# Patient Record
Sex: MALE | Race: WHITE | NOT HISPANIC OR LATINO | Employment: STUDENT | URBAN - METROPOLITAN AREA
[De-identification: names, ages, dates, MRNs, and addresses within clinical notes are randomized per-mention and may not be internally consistent; named-entity substitution may affect disease eponyms.]

---
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
## 2021-05-27 ASSESSMENT — PATIENT HEALTH QUESTIONNAIRE - PHQ9
SUM OF ALL RESPONSES TO PHQ QUESTIONS 1-9: 3
SUM OF ALL RESPONSES TO PHQ QUESTIONS 1-9: 4

## 2021-05-29 ENCOUNTER — HEALTH MAINTENANCE LETTER (OUTPATIENT)
Age: 21
End: 2021-05-29

## 2021-05-31 VITALS — HEIGHT: 65 IN | BODY MASS INDEX: 21.33 KG/M2 | WEIGHT: 128 LBS

## 2021-05-31 VITALS — BODY MASS INDEX: 20.55 KG/M2 | WEIGHT: 116 LBS | HEIGHT: 63 IN

## 2021-06-04 VITALS
BODY MASS INDEX: 21.24 KG/M2 | TEMPERATURE: 97.2 F | WEIGHT: 132.2 LBS | OXYGEN SATURATION: 99 % | DIASTOLIC BLOOD PRESSURE: 59 MMHG | SYSTOLIC BLOOD PRESSURE: 113 MMHG | HEART RATE: 67 BPM | HEIGHT: 66 IN

## 2021-06-06 NOTE — PROGRESS NOTES
[facility] Well Child Check    ASSESSMENT & PLAN  Rafa Clayton is a 19 y.o. who has normal growth and normal development.    Diagnoses and all orders for this visit:    Encounter for routine child health examination with abnormal findings  -     Hearing Screening  -     Vision Screening  -     PHQ9 Depression Screen  -     Lipid Cascade FASTING  -     Glucose    Other orders  -     Meningococcal MCV4P  -     Influenza, Seasonal Quad, PF, =/> 6months (syringe)  -     Cancel: Hemoglobin  -     Cancel: Lipid Cascade RANDOM  -     Varicella vaccine subq        Return to clinic in 1 year for a Well Child Check or sooner as needed    IMMUNIZATIONS/LABS  Immunizations were reviewed and orders were placed as appropriate.  I have discussed the risks and benefits of all of the vaccine components with the patient/parents.  All questions have been answered.    REFERRALS  Dental:  Recommend routine dental care as appropriate., The patient has already established care with a dentist.  Other:  No additional referrals were made at this time.    ANTICIPATORY GUIDANCE  I have reviewed age appropriate anticipatory guidance.  Social:  Friends, Peer Pressure, Employment and Extracurricular Activities  Parenting:  Support, Harper/Dependence and Confidential Health Care  Nutrition:  Body Image  Play and Communication:  Organized Sports and Appropriate Use of TV  Health:  Drugs, Smoking, Alcohol, Self Testicular Exam and Sun Screen  Safety:  Seat Belts  Sexuality:  Safe Sex and STD's    HEALTH HISTORY  Do you have any concerns that you'd like to discuss today?: No concerns       Roomed by: IRAJ Randolph CMA    Refills needed? No    Do you have any forms that need to be filled out? No        Do you have any significant health concerns in your family history?: No  Family History   Problem Relation Age of Onset     No Medical Problems Mother      No Medical Problems Father      No Medical Problems Sister      No Medical Problems Brother       Cancer Maternal Grandmother      Since your last visit, have there been any major changes in your family, such as a move, job change, separation, divorce, or death in the family?: Yes, cousins death  Has a lack of transportation kept you from medical appointments?: No    Home  Who lives in your home?:  Patient, mom, dad, brother, sister  Social History     Social History Narrative     Not on file     Do you have any concerns about losing your housing?: No  Is your housing safe and comfortable?: Yes  Do you have any trouble with sleep?:  No    Education  What school do you child attend?:  [employer]  What grade are you in?:  12th  How do you perform in school (grades, behavior, attention, homework?: decent     Eating  Do you eat regular meals including fruits and vegetables?:  no  What are you drinking (cow's milk, water, soda, juice, sports drinks, energy drinks, etc)?: cow's milk- whole and water  Have you been worried that you don't have enough food?: No  Do you have concerns about your body or appearance?:  No    Activities  Do you have friends?:  yes  Do you get at least one hour of physical activity per day?:  no  How many hours a day are you in front of a screen other than for schoolwork (computer, TV, phone)?:  3  What do you do for exercise?:  Life weights, run, walks  Do you have interest/participate in community activities/volunteers/school sports?:  yes    VISION/HEARING  Vision: Completed. See Results  Hearing:  Completed. See Results     Hearing Screening    125Hz 250Hz 500Hz 1000Hz 2000Hz 3000Hz 4000Hz 6000Hz 8000Hz   Right ear:    20 20  20 20    Left ear:   25 20 20  20 20    Comments: 40 at 1000 in both ears     Visual Acuity Screening    Right eye Left eye Both eyes   Without correction: 10/8 10/8 10/10   With correction:      Comments: Plus Lens: Pass: blurring of vision with +2.50 lens glasses      MENTAL HEALTH SCREENING  Social-emotional & mental health screening:   PHQ 3/13/2020  "  PHQ-A Total Score 4   PHQ-A Depressed most days in past year No   PHQ-A Mood affect on daily activities Somewhat difficult   PHQ-A Suicide Ideation past 2 weeks Not at all   PHQ-A Suicide Ideation past month No   PHQ-A Previous suicide attempt No       Depression: No current symptoms.  Peer relationships: no concerns  Family relationships: no concerns  Trouble with the law: No    TB Risk Assessment:  The patient and/or parent/guardian answer positive to:  no known risk of TB    Dyslipidemia Risk Screening  Have either of your parents or any of your grandparents had a stroke or heart attack before age 55?: No  Any parents with high cholesterol or currently taking medications to treat?: No     Dental  When was the last time you saw the dentist?: 3-6 months ago   Parent/Guardian declines the fluoride varnish application today. Fluoride not applied today.    There is no problem list on file for this patient.      Drugs  Does the patient use tobacco/alcohol/drugs?:  no    Safety  Does the patient have any safety concerns (peer or home)?:  no  Does the patient use safety belts, helmets and other safety equipment?:  yes    Sex  Have you ever had sex?:  Yes  Have any of your past or current sex partners been infected with HIV, bisexual, or injection drug users?: No  Are you having unprotected sex with multiple partners?:  No  Have you or any of your partners ever been treated for a sexually transmitted infections?:  No    MEASUREMENTS  Height:  5' 5.5\" (1.664 m)  Weight: 132 lb 3.2 oz (60 kg)  BMI: Body mass index is 21.66 kg/m .  Blood Pressure: 113/59  Blood pressure percentiles are not available for patients who are 18 years or older.    PHYSICAL EXAM    General:  alert, in no acute distress  Hydration Status: well hydrated with moist mucous membranes,   Skin: No obvious skin rash or lesions  Head: normocephalic, atraumatic  Eyes: anicteric, no injection, no discharge, no swelling  Ears: TMs clear bilaterally  Nose: " septum midline, pink mucosa, no discharge  Oropharynx: moist mucosa, no oral lesions  Neck: supple, full range of motion  Chest: clear to auscultation bilaterally  Cardiovascular: regular rhythm, regular rate  Abdomen: normal bowel sounds, soft, non-tender, no organomegaly or masses  : Patient declines exam  Musculoskeletal: warm and well-perfused, normal tone throughout  Lymphatics: no adenopathy noted  Neurologic: grossly intact, strength normal

## 2021-06-09 NOTE — PROGRESS NOTES
"Rafa Clayton is a 19 y.o. male who is being evaluated via a billable telephone visit.    Chief Complaint   Patient presents with     Requesting Covid testing     patient has been exposed     Rafa Clayton is traveling to [place] and has been exposed to friends who were testing positive for COVID 19. He has had no symptoms but would like to get tested there [place] before he returns to [place]. He was told that if he had an order faxed over to the testing facillity he could get the test. The fax number is [phone].  ROS: negative for fever, cough, malaise, fatigue, myalgias and as in HPI.    The patient has been notified of following:     \"This telephone visit will be conducted via a call between you and your physician/provider. We have found that certain health care needs can be provided without the need for a physical exam.  This service lets us provide the care you need with a short phone conversation.  If a prescription is necessary we can send it directly to your pharmacy.  If lab work is needed we can place an order for that and you can then stop by our lab to have the test done at a later time.    Telephone visits are billed at different rates depending on your insurance coverage. During this emergency period, for some insurers they may be billed the same as an in-person visit.  Please reach out to your insurance provider with any questions.    If during the course of the call the physician/provider feels a telephone visit is not appropriate, you will not be charged for this service.\"    Patient has given verbal consent to a Telephone visit? Yes    What phone number would you like to be contacted at? [phone]    Patient would like to receive their AVS by AVS Preference: Rose.    Additional provider notes: Objective:  General: alert, oriented and in no distress  Respiratory: no difficulty speaking in full sentences, no apparent shortness of breath or audible wheezing.  Mood: no pressured speech, " normal voice inflections, no agitation, no suicidal or homicidal ideations.    Assessment/Plan:  1. Exposure to COVID-19 virus  Asymptomatic COVID-19 Virus (CORONAVIRUS) PCR     Test to be faxed today to [place] testing [facility] for Covid 19  - [phone]      Phone call duration:  5 minutes    Erick Mabry

## 2021-06-11 NOTE — PROGRESS NOTES
[facility] Well Child Check    ASSESSMENT & PLAN  Rafa Clayton is a 16  y.o. 7  m.o. who has normal growth and normal development.    Diagnoses and all orders for this visit:    Encounter for routine child health examination without abnormal findings  -     Hearing Screening  -     Vision Screening    Travel advice encounter    Other orders  -     azithromycin (ZITHROMAX) 500 MG tablet; Take two tablets once for diarrhea  Dispense: 4 tablet; Refill: 0  -     typhoid (VIVOTIF) SR capsule; Take one every other day- finish one week prior to travel  Dispense: 4 capsule; Refill: 0   doing well with clindamycin topical solution  Continue as needed for acne      Travel consultation  Patient will be traveling to [place] in the upcoming months with his family  Discussed CDC recommendations with him and his mother  We will send prescription for as needed use of azithromycin for traveler's diarrhea  Send prescription for Vivotif for him to complete prior to travel  Return to clinic in 1 year for a Well Child Check or sooner as needed    IMMUNIZATIONS/LABS  Immunizations were reviewed and orders were placed as appropriate.    REFERRALS  Dental:  Recommend routine dental care as appropriate.  Other:  No additional referrals were made at this time.    ANTICIPATORY GUIDANCE  I have reviewed age appropriate anticipatory guidance.  Social:  Friends and Peer Pressure  Nutrition:  Junk Food and Body Image  Play and Communication:  Organized Sports and Hobbies  Health:  Acne, Drugs, Smoking, Alcohol and Self Testicular Exam  Safety:  Seat Belts  Sexuality:  Body Changes    HEALTH HISTORY  Do you have any concerns that you'd like to discuss today?: No concerns       Refills needed? No    Do you have any forms that need to be filled out? No        Do you have any significant health concerns in your family history?: No  No family history on file.  Since your last visit, have there been any major changes in your family, such as a move, job  change, separation, divorce, or death in the family?: No    Home  Who lives in your home?:   Parents, brother and sister   Social History     Social History Narrative     No narrative on file     Do you have any trouble with sleep?:  No    Education  What school does your child attend?:   [employer]   What grade is your child in?:  10th  How does the patient perform in school (grades, behavior, attention, homework?:  No concerns      Eating  Does patient eat regular meals including fruits and vegetables?:  yes  What is the patient drinking (cow's milk, water, soda, juice, sports drinks, energy drinks, etc)?: cow's milk- whole and water  Does patient have concerns about body or appearance?:  No    Activities  Does the patient have friends?:  yes  Does the patient get at least one hour of physical activity per day?:  no  Does the patient have less than 2 hours of screen time per day (aside from homework)?:  yes  What does your child do for exercise?:   Walking   Does the patient have interest/participate in community activities/volunteers/school sports?:  no    MENTAL HEALTH SCREENING  PHQ-2 Total Score: 0 (6/28/2017 12:00 PM)  PHQ-2 Total Score: 0 (6/28/2017 12:00 PM)    VISION/HEARING  Vision: Completed. See Results  Hearing:  Completed. See Results     Hearing Screening    125Hz 250Hz 500Hz 1000Hz 2000Hz 3000Hz 4000Hz 6000Hz 8000Hz   Right ear:   25 25 25  25     Left ear:   25 25 25  25        Visual Acuity Screening    Right eye Left eye Both eyes   Without correction: 10/8 10/8    With correction:          TB Risk Assessment:  The patient and/or parent/guardian answer positive to:  patient and/or parent/guardian answer 'no' to all screening TB questions    Dental  Is your child being seen by a dentist?  Yes  Flouride Varnish Application Screening  Is child seen by dentist?     Yes    There is no problem list on file for this patient.      Drugs  Does the patient use tobacco/alcohol/drugs?:  no    Safety  Does the  "patient have any safety concerns (peer or home)?:  no  Does the patient use safety belts, helmets and other safety equipment?:  yes    Sex  Is the patient sexually active?:  no    MEASUREMENTS  Height:  5' 2.5\" (1.588 m)  Weight: 116 lb (52.6 kg)  BMI: Body mass index is 20.88 kg/(m^2).  Blood Pressure: 94/62  Blood pressure percentiles are 6 % systolic and 47 % diastolic based on NHBPEP's 4th Report. Blood pressure percentile targets: 90: 125/78, 95: 128/82, 99 + 5 mmH/95.    PHYSICAL EXAM  Physical Examination: General appearance - alert, well appearing, and in no distress  Mental status - alert, oriented to person, place, and time  Eyes - pupils equal and reactive, extraocular eye movements intact  Ears - bilateral TM's and external ear canals normal  Nose - normal and patent, no erythema, discharge or polyps  Mouth - mucous membranes moist, pharynx normal without lesions  Neck - supple, no significant adenopathy  Lymphatics - no palpable lymphadenopathy, no hepatosplenomegaly  Chest - clear to auscultation, no wheezes, rales or rhonchi, symmetric air entry  Heart - normal rate, regular rhythm, normal S1, S2, no murmurs, rubs, clicks or gallops  Abdomen - soft, nontender, nondistended, no masses or organomegaly  Back exam - full range of motion, no tenderness, palpable spasm or pain on motion  Neurological - alert, oriented, normal speech, no focal findings or movement disorder noted  Musculoskeletal - no joint tenderness, deformity or swelling  Extremities - peripheral pulses normal, no pedal edema, no clubbing or cyanosis  Skin - normal coloration and turgor, no rashes, no suspicious skin lesions noted    "

## 2021-06-16 NOTE — PROGRESS NOTES
ASSESSMENT/PLAN  1. Health care maintenance  Discussed with mom and patient had been with mom out of the room with patient alone he denies any recent drug use  We will obtain a drug screen today to rule out any recent use but discussed within the limitations of a urine drug screen  Patient feels that explains both with the mother in the room and outside of the room that the majority of changes have been due to laziness and fatigue he does not get enough sleep on a regular basis  Discussed proper sleep hygiene and sleep changes and needs at his age  We will follow based on drug screen results  Again patient declined any drug use recently when he was alone in the room with me  Discussed with mom present other concerns which he does not express such as anxiety or depression leading to change in personality and performance at school levels  - Drug Abuse 1+, Urine        SUBJECTIVE:   Chief Complaint   Patient presents with     Drug screening     Rafa Clayton 17 y.o. male    Current Outpatient Prescriptions   Medication Sig Dispense Refill     clindamycin (CLEOCIN T) 1 % external solution        azithromycin (ZITHROMAX) 500 MG tablet Take two tablets once for diarrhea 4 tablet 0     typhoid (VIVOTIF) SR capsule Take one every other day- finish one week prior to travel 4 capsule 0     No current facility-administered medications for this visit.      Allergies: Review of patient's allergies indicates no known allergies.   No LMP for male patient.    HPI:   Mom presents with 17-year-old male today for concerns over possible drug use and would like to have a drug screen done  Patient has recently failed a class at his high school he has previously been a straight a student  Is been missing assignments and missing or being late on days of academic testing leading to the drastic change in his grade  He feels his sleeping is sporadic and a large reason been over tired and lazy for change in the performance  We discussed with  "him and his mother today limitations of drug screening we will obtain this today but when his mother is not in the room denies any recent drug use so do not expect anything be positive on drug screen  Discussed with the patient proper sleep hygiene and the need for regular sleep  Encouraged him to try to bring his GPA back up to where was previously as he was having great success and will open up many more opportunities for him moving forward    ROS: negative except as per HPI    OBJECTIVE:   The patient appears well, alert, oriented x 3, in no distress.  /58 (Patient Site: Right Arm, Patient Position: Sitting, Cuff Size: Adult Regular)  Pulse 84  Temp 98.4  F (36.9  C) (Oral)   Resp 16  Ht 5' 5.16\" (1.655 m)  Wt 128 lb (58.1 kg)  BMI 21.2 kg/m2     Lungs: clear, good air entry, no wheezes, rhonchi or rales.   Cardiac: S1 and S2 normal, no murmurs, regular rate and rhythm.   Extremities: show no edema, normal peripheral pulses.   Neurological: normal, no focal findings.  Skin: clear, dry, no rashes/lesions  Psych- normal mood and affect      Pt states an understanding and agrees to the above plan.    "

## 2021-06-18 NOTE — PATIENT INSTRUCTIONS - HE
Patient Instructions by Nichole Randolph CMA at 3/13/2020  2:50 PM     Author: Nichole Randolph CMA Service: -- Author Type: Certified Medical Assistant    Filed: 3/13/2020  2:57 PM Encounter Date: 3/13/2020 Status: Signed    : Nichole Randolph CMA (Certified Medical Assistant)         3/13/2020  Wt Readings from Last 1 Encounters:   02/22/18 128 lb (58.1 kg) (34 %, Z= -0.41)*     * Growth percentiles are based on CDC (Boys, 2-20 Years) data.       Acetaminophen Dosing Instructions  (May take every 4-6 hours)      WEIGHT   AGE Infant/Children's  160mg/5ml Children's   Chewable Tabs  80 mg each Alberto Strength  Chewable Tabs  160 mg     Milliliter (ml) Soft Chew Tabs Chewable Tabs   6-11 lbs 0-3 months 1.25 ml     12-17 lbs 4-11 months 2.5 ml     18-23 lbs 12-23 months 3.75 ml     24-35 lbs 2-3 years 5 ml 2 tabs    36-47 lbs 4-5 years 7.5 ml 3 tabs    48-59 lbs 6-8 years 10 ml 4 tabs 2 tabs   60-71 lbs 9-10 years 12.5 ml 5 tabs 2.5 tabs   72-95 lbs 11 years 15 ml 6 tabs 3 tabs   96 lbs and over 12 years   4 tabs     Ibuprofen Dosing Instructions- Liquid  (May take every 6-8 hours)      WEIGHT   AGE Concentrated Drops   50 mg/1.25 ml Infant/Children's   100 mg/5ml     Dropperful Milliliter (ml)   12-17 lbs 6- 11 months 1 (1.25 ml)    18-23 lbs 12-23 months 1 1/2 (1.875 ml)    24-35 lbs 2-3 years  5 ml   36-47 lbs 4-5 years  7.5 ml   48-59 lbs 6-8 years  10 ml   60-71 lbs 9-10 years  12.5 ml   72-95 lbs 11 years  15 ml       Ibuprofen Dosing Instructions- Tablets/Caplets  (May take every 6-8 hours)    WEIGHT AGE Children's   Chewable Tabs   50 mg Alberto Strength   Chewable Tabs   100 mg Alberto Strength   Caplets    100 mg     Tablet Tablet Caplet   24-35 lbs 2-3 years 2 tabs     36-47 lbs 4-5 years 3 tabs     48-59 lbs 6-8 years 4 tabs 2 tabs 2 caps   60-71 lbs 9-10 years 5 tabs 2.5 tabs 2.5 caps   72-95 lbs 11 years 6 tabs 3 tabs 3 caps          Patient Education      BRIGHT FUTURES HANDOUT- PATIENT  18  THROUGH 21 YEAR VISITS  Here are some suggestions from [employer] experts that may be of value to your family.     HOW YOU ARE DOING  Enjoy spending time with your family.  Find activities you are really interested in, such as sports, theater, or volunteering.  Try to be responsible for your schoolwork or work obligations.  Always talk through problems and never use violence.  If you get angry with someone, try to walk away.  If you feel unsafe in your home or have been hurt by someone, let us know. Hotlines and community agencies can also provide confidential help.  Talk with us if you are worried about your living or food situation. Community agencies and programs such as SNAP can help.  Dont smoke, vape, or use drugs. Avoid people who do when you can. Talk with us if you are worried about alcohol or drug use in your family.    YOUR DAILY LIFE  Visit the dentist at least twice a year.  Brush your teeth at least twice a day and floss once a day.  Be a healthy eater.  Have vegetables, fruits, lean protein, and whole grains at meals and snacks.  Limit fatty, sugary, salty foods that are low in nutrients, such as candy, chips, and ice cream.  Eat when youre hungry. Stop when you feel satisfied.  Eat breakfast.  Drink plenty of water.  Make sure to get enough calcium every day.  Have 3 or more servings of low-fat (1%) or fat-free milk and other low-fat dairy products, such as yogurt and cheese.  Women: Make sure to eat foods rich in folate, such as fortified grains and dark- green leafy vegetables.  Aim for at least 1 hour of physical activity every day.  Wear safety equipment when you play sports.  Get enough sleep.  Talk with us about managing your health care and insurance as an adult.    YOUR FEELINGS  Most people have ups and downs. If you are feeling sad, depressed, nervous, irritable, hopeless, or angry, let us know or reach out to another health care professional.  Figure out healthy ways to deal with  stress.  Try your best to solve problems and make decisions on your own.  Sexuality is an important part of your life. If you have any questions or concerns, we are here for you.    HEALTHY BEHAVIOR CHOICES  Avoid using drugs, alcohol, tobacco, steroids, and diet pills. Support friends who choose not to use.  If you use drugs or alcohol, let us know or talk with another trusted adult about it. We can help you with quitting or cutting down on your use.  Make healthy decisions about your sexual behavior.  If you are sexually active, always practice safe sex. Always use birth control along with a condom to prevent pregnancy and sexually transmitted infections.  All sexual activity should be something you want. No one should ever force or try to convince you.  Protect your hearing at work, home, and concerts. Keep your earbud volume down.    STAYING SAFE  Always be a safe and cautious .  Insist that everyone use a lap and shoulder seat belt.  Limit the number of friends in the car and avoid driving at night.  Avoid distractions. Never text or talk on the phone while you drive.  Do not ride in a vehicle with someone who has been using drugs or alcohol.  If you feel unsafe driving or riding with someone, call someone you trust to drive you.  Wear helmets and protective gear while playing sports. Wear a helmet when riding a bike, a motorcycle, or an ATV or when skiing or skateboarding.  Always use sunscreen and a hat when youre outside.  Fighting and carrying weapons can be dangerous. Talk with your parents, teachers, or doctor about how to avoid these situations.      Helpful Resources:  National Domestic Violence Hotline: 709.788.7630   Consistent with Bright Futures: Guidelines for Health Supervision of Infants, Children, and Adolescents, 4th Edition  For more information, go to https://brightfutures.aap.org.

## 2021-06-20 NOTE — LETTER
Letter by Malvin Oakes MD at      Author: Malvin Oakes MD Service: -- Author Type: --    Filed:  Encounter Date: 3/18/2020 Status: (Other)         Rafa Clayton  [address] [place] [address]             March 18, 2020         Dear Mr. Clayton,    Below are the results from your recent visit:    Resulted Orders   Lipid [place] FASTING   Result Value Ref Range    Cholesterol 105 <=199 mg/dL    Triglycerides 46 <=149 mg/dL    HDL Cholesterol 36 (L) >=40 mg/dL    LDL Calculated 60 <=129 mg/dL    Patient Fasting > 8hrs? Yes    Glucose   Result Value Ref Range    Glucose 96 70 - 99 mg/dL    Patient Fasting > 8hrs? Yes     Narrative    Fasting Glucose reference range is 70-99 mg/dL per  American Diabetes Association (ADA) guidelines.       Lab tests are normal.  Good cholesterol but HDL is low.   Exercise, weight loss and substitution of monounsaturated for saturated fatty acids all can raise HDL-cholesterol. (Fish oil and marine omega-3 fatty acids).     Please call with questions or contact us using [employer].    Sincerely,        Electronically signed by Malvin Oakes MD

## 2021-09-18 ENCOUNTER — HEALTH MAINTENANCE LETTER (OUTPATIENT)
Age: 21
End: 2021-09-18

## 2022-06-25 ENCOUNTER — HEALTH MAINTENANCE LETTER (OUTPATIENT)
Age: 22
End: 2022-06-25

## 2022-11-02 ENCOUNTER — IMMUNIZATION (OUTPATIENT)
Dept: FAMILY MEDICINE | Facility: CLINIC | Age: 22
End: 2022-11-02
Payer: COMMERCIAL

## 2022-11-02 PROCEDURE — 90686 IIV4 VACC NO PRSV 0.5 ML IM: CPT

## 2022-11-02 PROCEDURE — 90471 IMMUNIZATION ADMIN: CPT

## 2023-07-08 ENCOUNTER — HEALTH MAINTENANCE LETTER (OUTPATIENT)
Age: 23
End: 2023-07-08

## 2024-03-27 ENCOUNTER — OFFICE VISIT (OUTPATIENT)
Dept: FAMILY MEDICINE | Facility: CLINIC | Age: 24
End: 2024-03-27
Payer: COMMERCIAL

## 2024-03-27 VITALS
HEART RATE: 62 BPM | SYSTOLIC BLOOD PRESSURE: 127 MMHG | TEMPERATURE: 98.6 F | BODY MASS INDEX: 21.98 KG/M2 | WEIGHT: 136.8 LBS | RESPIRATION RATE: 16 BRPM | OXYGEN SATURATION: 99 % | HEIGHT: 66 IN | DIASTOLIC BLOOD PRESSURE: 83 MMHG

## 2024-03-27 DIAGNOSIS — Z02.89 ENCOUNTER FOR PHYSICAL EXAMINATION RELATED TO EMPLOYMENT: Primary | ICD-10-CM

## 2024-03-27 PROCEDURE — 86580 TB INTRADERMAL TEST: CPT | Performed by: FAMILY MEDICINE

## 2024-03-27 PROCEDURE — 99385 PREV VISIT NEW AGE 18-39: CPT | Performed by: FAMILY MEDICINE

## 2024-03-27 SDOH — HEALTH STABILITY: PHYSICAL HEALTH: ON AVERAGE, HOW MANY MINUTES DO YOU ENGAGE IN EXERCISE AT THIS LEVEL?: 60 MIN

## 2024-03-27 SDOH — HEALTH STABILITY: PHYSICAL HEALTH: ON AVERAGE, HOW MANY DAYS PER WEEK DO YOU ENGAGE IN MODERATE TO STRENUOUS EXERCISE (LIKE A BRISK WALK)?: 2 DAYS

## 2024-03-27 ASSESSMENT — SOCIAL DETERMINANTS OF HEALTH (SDOH): HOW OFTEN DO YOU GET TOGETHER WITH FRIENDS OR RELATIVES?: MORE THAN THREE TIMES A WEEK

## 2024-03-27 NOTE — PROGRESS NOTES
Preventive Care Visit  [facility]  Lloyd Haskins MD, Family Medicine  Mar 27, 2024      Assessment & Plan     Encounter for physical examination related to employment  We reviewed his vaccines are up-to-date.  This will be printed form.  PPD placed today.  He will follow-up in 48 hours for reading.  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              Counseling  Appropriate preventive services were discussed with this patient, including applicable screening as appropriate for fall prevention, nutrition, physical activity, Tobacco-use cessation, weight loss and cognition.  Checklist reviewing preventive services available has been given to the patient.  Reviewed patient's diet, addressing concerns and/or questions.   He is at risk for lack of exercise and has been provided with information to increase physical activity for the benefit of his well-being.   The patient was instructed to see the dentist every 6 months.           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  He will be studying the biology of aging at cellular level.  He is otherwise in good health.          3/27/2024   General Health   How would you rate your overall physical health? Excellent   Feel stress (tense, anxious, or unable to sleep) Not at all         3/27/2024   Nutrition   Three or more servings of calcium each day? Yes   Diet: Regular (no restrictions)   How many servings of fruit and vegetables per day? 4 or more   How many sweetened beverages each day? 0-1          3/27/2024   Exercise   Days per week of moderate/strenous exercise 2 days   Average minutes spent exercising at this level 60 min   (!) EXERCISE CONCERN      3/27/2024   Social Factors   Frequency of gathering with friends or relatives More than three times a week   Worry food won't last until get money to buy more No   Food not last or not have enough money for food? No   Do you have housing?  No   Are you worried about losing your housing? No   Lack of transportation? No   Unable to get utilities (heat,electricity)? No   Want help with housing or utility concern? No   (!) HOUSING CONCERN PRESENT      3/27/2024   Dental   Dentist two times every year? (!) NO         3/27/2024   TB Screening   Were you born outside of the [place]? No         Today's PHQ-2 Score:       3/27/2024    10:07 AM   PHQ-2 ( 1999 [employer])   Q1: Little interest or pleasure in doing things 0   Q2: Feeling down, depressed or hopeless 0   PHQ-2 Score 0   Q1: Little interest or pleasure in doing things Not at all   Q2: Feeling down, depressed or hopeless Not at all   PHQ-2 Score 0           3/27/2024   Substance Use   Alcohol more than 3/day or more than 7/wk No   Do you use any other substances recreationally? (!) ALCOHOL     Social History     Tobacco Use    Smoking status: Never     Passive exposure: Never    Smokeless tobacco: Never   Vaping Use    Vaping Use: Some days    Passive vaping exposure: Yes   Substance Use Topics    Alcohol use: Yes    Drug use: Yes     Types: LSD, Nitrous oxide, Psilocybin     Comment: Also DMT             3/27/2024   One time HIV Screening   Previous HIV test? No         3/27/2024   STI Screening   New sexual partner(s) since last STI/HIV test? (!) DECLINE         3/27/2024   Contraception/Family Planning   Questions about contraception or family planning No        Reviewed and updated as needed this visit by Provider                    No past medical history on file.  No past surgical history on  "file.      Review of Systems  CONSTITUTIONAL: NEGATIVE for fever, chills, change in weight  INTEGUMENTARY/SKIN: NEGATIVE for worrisome rashes, moles or lesions  EYES: NEGATIVE for vision changes or irritation  ENT/MOUTH: NEGATIVE for ear, mouth and throat problems  RESP: NEGATIVE for significant cough or SOB  BREAST: NEGATIVE for masses, tenderness or discharge  CV: NEGATIVE for chest pain, palpitations or peripheral edema  GI: NEGATIVE for nausea, abdominal pain, heartburn, or change in bowel habits  : NEGATIVE for frequency, dysuria, or hematuria  MUSCULOSKELETAL: NEGATIVE for significant arthralgias or myalgia  NEURO: NEGATIVE for weakness, dizziness or paresthesias  ENDOCRINE: NEGATIVE for temperature intolerance, skin/hair changes  HEME: NEGATIVE for bleeding problems  PSYCHIATRIC: NEGATIVE for changes in mood or affect     Objective    Exam  BP (!) 154/77 (BP Location: Right arm, Patient Position: Sitting, Cuff Size: Adult Regular)   Pulse 66   Temp 98.6  F (37  C) (Tympanic)   Resp 16   Ht 1.664 m (5' 5.5\")   Wt 62.1 kg (136 lb 12.8 oz)   SpO2 99%   BMI 22.42 kg/m     Estimated body mass index is 22.42 kg/m  as calculated from the following:    Height as of this encounter: 1.664 m (5' 5.5\").    Weight as of this encounter: 62.1 kg (136 lb 12.8 oz).    Physical Exam  GENERAL: alert and no distress  EYES: Eyes grossly normal to inspection, PERRL and conjunctivae and sclerae normal  HENT: ear canals and TM's normal, nose and mouth without ulcers or lesions  NECK: no adenopathy, no asymmetry, masses, or scars  RESP: lungs clear to auscultation - no rales, rhonchi or wheezes  CV: regular rate and rhythm, normal S1 S2, no S3 or S4, no murmur, click or rub, no peripheral edema  ABDOMEN: soft, nontender, no hepatosplenomegaly, no masses and bowel sounds normal  MS: no gross musculoskeletal defects noted, no edema  SKIN: no suspicious lesions or rashes  NEURO: Normal strength and tone, mentation intact and " speech normal  PSYCH: mentation appears normal, affect normal/bright        Signed Electronically by: Lloyd Haskins MD

## 2024-03-27 NOTE — COMMUNITY RESOURCES LIST (ENGLISH)
March 27, 2024           YOUR PERSONALIZED LIST OF SERVICES & PROGRAMS           & SHELTER    Housing      Free - Client Services  [address] [place] [address] (Distance: 3.2 miles)  Phone: [phone]  Website: https://MYagonism.com/  Language: English  Fee: Free  Transportation Options: Free transportation      [facility] - Housing Stabilization Services  Phone: (274) 256-5627  Website: https://THINK360/Housing-Stabilization.html  Language: English  Hours: Mon 9:00 AM - 5:00 PM Tue 9:00 AM - 5:00 PM Wed 9:00 AM - 5:00 PM Thu 9:00 AM - 5:00 PM Fri 9:00 AM - 5:00 PM  Fee: Insurance  Accessibility: Deaf or hard of hearing, Translation services      [facility]  Phone: [phone]  Website: https://www.Geomerics.org/  Language: English    Case Management      Living - Housing Stabilization Services  [address] [place] [address] (Distance: 4.6 miles)  Phone: [phone] [phone]  Website: https://TargAnox  Language: Divehi, English, Citizen of [place]  Fee: Insurance, Self pay      [employer] - Housing Stabilization Services  Phone: (985) 723-8063  Website: https://homebasemn.com/  Language: English  Hours: Mon 8:00 AM - 4:00 PM Tue 8:00 AM - 4:00 PM Wed 8:00 AM - 4:00 PM Thu 8:00 AM - 4:00 PM Fri 8:00 AM - 4:00 PM  Fee: Free  Accessibility: Blind accommodation, Deaf or hard of hearing  Transportation Options: Free transportation      [facility] Housing Stabilization Services (HSS)  Phone: [phone]  Website: https://www.Sapiens.Porphyrio/resources  Language: English, Syriac  Hours: Mon 8:00 AM - 4:00 PM Tue 8:00 AM - 4:00 PM Wed 8:00 AM - 4:00 PM Thu 8:00 AM - 4:00 PM Fri 8:00 AM - 4:00 PM  Fee: Free, Insurance  Accessibility: [place] accessible, Blind accommodation, Deaf or hard of hearing, Translation services    Drop-In Services      [facility] - Warming or cooling center - [facility] [place]  [address] [place] [address] (Distance: 0.2 miles)  Phone: [phone]  Language: English  Fee: Free  Accessibility: Translation services, [place] accessible      [facility] - Warming or cooling center - [facility]  [address] [place] [address] (Distance: 5.6 miles)  Phone: [phone]  Language: English  Fee: Free  Accessibility: [place] accessible  Transportation Options: Free transportation      [facility] [employer] [facility]  Website: http://www.laundrylove.org               IMPORTANT NUMBERS & WEBSITES        Emergency Services  911  .   [employer]  211 http://211unitedway.org  .   Poison Control  (871) 715-8746 http://[place]poison.org http://wisconsinpoison.org  .     Suicide and Crisis Lifeline  988 http://988Mobclixline.org  .   Childhelp [place] Child Abuse Hotline  682.833.1167 http://Childhelphotline.org   .   [place] Sexual Assault Hotline  (361) 109-4128 (HOPE) http://Helidyne.org   .     [place] Runaway Safeline  (831) 934-8159 (RUNAWAY) http://AyalogicruHaozu.com.org  .   Pregnancy & Postpartum Support  Call/text 704-425-6067  [place]: http://ppsupportmn.org  [place]: http://Azalea Networks.com/wi  .   Substance Abuse National Helpline ([facility])  617-291-HELP (4185) http://Findtreatment.gov   .                DISCLAIMER: [employer] does not endorse any service providers mentioned in this resource list. [employer] does not guarantee that the services mentioned in this resource list will be available to you or will improve your health or wellness.    [facility]

## 2024-03-29 ENCOUNTER — ALLIED HEALTH/NURSE VISIT (OUTPATIENT)
Dept: FAMILY MEDICINE | Facility: CLINIC | Age: 24
End: 2024-03-29
Payer: COMMERCIAL

## 2024-03-29 DIAGNOSIS — Z02.89 ENCOUNTER FOR PHYSICAL EXAMINATION RELATED TO EMPLOYMENT: Primary | ICD-10-CM

## 2024-03-29 LAB
PPDINDURATION: 0 MM (ref 0–4.99)
PPDREDNESS: NORMAL

## 2024-03-29 PROCEDURE — 99207 PR NO CHARGE NURSE ONLY: CPT

## 2024-03-29 NOTE — PROGRESS NOTES
Patient is here today for a Mantoux (TST) test results.    Did patient return to clinic 48-72 hours from Mantoux (TST) placement: Yes -     PPD Induration   Date Value Ref Range Status   03/29/2024 0 0 - 4.99 mm Final     PPD Redness   Date Value Ref Range Status   03/29/2024 Not Present  Final         Induration Size? Induration <5mm - Enter results in Enter/Edit Activity. Route results to ordering provider.     Patient needs form signed? Yes. Follow clinic form process.     Patient reports having previously had the BCG Vaccine: No    Does patient need a two step? No

## 2025-05-10 ENCOUNTER — HEALTH MAINTENANCE LETTER (OUTPATIENT)
Age: 25
End: 2025-05-10